# Patient Record
Sex: FEMALE | Race: BLACK OR AFRICAN AMERICAN
[De-identification: names, ages, dates, MRNs, and addresses within clinical notes are randomized per-mention and may not be internally consistent; named-entity substitution may affect disease eponyms.]

---

## 2018-03-15 ENCOUNTER — HOSPITAL ENCOUNTER (OUTPATIENT)
Dept: HOSPITAL 24 - RAD | Age: 53
End: 2018-03-15
Attending: NURSE PRACTITIONER
Payer: MEDICAID

## 2018-03-15 DIAGNOSIS — Z12.31: Primary | ICD-10-CM

## 2018-03-15 PROCEDURE — 77067 SCR MAMMO BI INCL CAD: CPT

## 2018-03-15 NOTE — MG
HISTORY:  SCREENING 



Comparison: Previous mammograms dated 03/13/2017 and 12/03/2015.



FINDINGS: 

Bilateral CC and MLO projections of the right and left breast were obtained.  Scattered fibroglandula
r tissue is seen to be present.  Small intramammary lymph nodes are again seen bilaterally. There is 
no evidence of developing mass, areas architectural distortion, or suspicious microcalcifications in 
either breast..  No skin thickening or nipple retraction is appreciated.   No pathological lymphadeno
[place] can be identified. Benign-appearing calcifications scattered throughout the right and left amara
sts are observed.

 

IMPRESSION:  NO RADIOGRAPHIC EVIDENCE OF MALIGNANCY.  

 

ACR CATEGORY: 2 - benign findings.

 

FOLLOW-UP EXAM 1 YEAR. 



Diagnostic CAD was utilized and reviewed.



* 0 (ZERO) - ASSESSMENT INCOMPLETE; ADDITIONAL IMAGING IS NEEDED. 

* 1/1 (ONE) - NEGATIVE. 

* 2/II (TWO) - BENIGN FINDINGS.

 * 3/III (THREE) - PROBABLY BENIGN FINDING; SHORT INTERVAL FOLLOW-UP

SUGGESTED.

 * 4/IV (FOUR) - SUSPICIOUS ABNORMALITY; BIOPSY SHOULD BE CONSIDERED.

 * 5/V - HIGHLY SUSPICIOUS OF MALIGNANCY; BIOPSY SHOULD BE PERFORMED.

 

A NEGATIVE X-RAY REPORT SHOULD NOT DELAY BIOPSY IF A DOMINANT OR

CLINICALLY SUSPICIOUS MASS IS PRESENT; 4 TO 8 PERCENT OF CANCERS ARE NOT IDENTIFIED BY X-RAY.  A NEGA
TIVE REPORT MAY REINFORCE THE CLINICAL IMPRESSION.  ADENOSIS AND DENSE BREASTS MAY OBSCURE AN UNDERLY
ING NEOPLASM.

New



Reported By:Electronically Signed by WALLY OKEEFE MD at 3/15/2018 1:10:31 PM

## 2018-12-04 ENCOUNTER — HOSPITAL ENCOUNTER (OUTPATIENT)
Dept: HOSPITAL 67 - US | Age: 53
Discharge: HOME | End: 2018-12-04
Attending: NURSE PRACTITIONER
Payer: COMMERCIAL

## 2018-12-04 DIAGNOSIS — R10.13: Primary | ICD-10-CM

## 2020-11-24 ENCOUNTER — HOSPITAL ENCOUNTER (OUTPATIENT)
Dept: HOSPITAL 67 - RAD | Age: 55
Discharge: HOME | End: 2020-11-24
Attending: NURSE PRACTITIONER
Payer: COMMERCIAL

## 2020-11-24 DIAGNOSIS — M06.4: Primary | ICD-10-CM

## 2020-11-24 DIAGNOSIS — M79.7: ICD-10-CM

## 2020-12-04 ENCOUNTER — HOSPITAL ENCOUNTER (EMERGENCY)
Dept: HOSPITAL 67 - ED | Age: 55
Discharge: HOME | End: 2020-12-04
Payer: COMMERCIAL

## 2020-12-04 VITALS — DIASTOLIC BLOOD PRESSURE: 78 MMHG | SYSTOLIC BLOOD PRESSURE: 121 MMHG

## 2020-12-04 VITALS — TEMPERATURE: 98.6 F

## 2020-12-04 VITALS — WEIGHT: 165 LBS | BODY MASS INDEX: 27.49 KG/M2 | HEIGHT: 65 IN
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
## 2020-12-04 DIAGNOSIS — S42.211A: Primary | ICD-10-CM

## 2020-12-04 PROCEDURE — 96372 THER/PROPH/DIAG INJ SC/IM: CPT

## 2020-12-04 PROCEDURE — 99283 EMERGENCY DEPT VISIT LOW MDM: CPT

## 2020-12-10 ENCOUNTER — HOSPITAL ENCOUNTER (OUTPATIENT)
Dept: HOSPITAL 67 - CT | Age: 55
Discharge: HOME | End: 2020-12-10
Attending: PHYSICIAN ASSISTANT
Payer: COMMERCIAL

## 2020-12-10 DIAGNOSIS — S42.221A: Primary | ICD-10-CM

## 2021-01-15 ENCOUNTER — HOSPITAL ENCOUNTER (OUTPATIENT)
Dept: HOSPITAL 67 - RAD | Age: 56
Discharge: HOME | End: 2021-01-15
Attending: PHYSICIAN ASSISTANT
Payer: COMMERCIAL

## 2021-01-15 DIAGNOSIS — M25.511: Primary | ICD-10-CM

## 2021-03-05 ENCOUNTER — HOSPITAL ENCOUNTER (OUTPATIENT)
Dept: HOSPITAL 67 - RAD | Age: 56
Discharge: HOME | End: 2021-03-05
Attending: NURSE PRACTITIONER
Payer: COMMERCIAL

## 2021-03-05 DIAGNOSIS — E55.9: Primary | ICD-10-CM

## 2021-03-05 DIAGNOSIS — M25.511: ICD-10-CM

## 2021-03-05 DIAGNOSIS — M06.4: ICD-10-CM

## 2021-03-05 DIAGNOSIS — M06.09: ICD-10-CM

## 2021-03-05 DIAGNOSIS — M85.89: ICD-10-CM

## 2021-04-26 ENCOUNTER — HOSPITAL ENCOUNTER (OUTPATIENT)
Dept: HOSPITAL 67 - MRI | Age: 56
Discharge: HOME | End: 2021-04-26
Attending: PHYSICIAN ASSISTANT
Payer: COMMERCIAL

## 2021-04-26 DIAGNOSIS — M25.511: Primary | ICD-10-CM

## 2022-11-16 ENCOUNTER — HOSPITAL ENCOUNTER (OUTPATIENT)
Dept: HOSPITAL 67 - LABW | Age: 57
Discharge: HOME | End: 2022-11-16
Attending: PODIATRIST
Payer: COMMERCIAL

## 2022-11-16 DIAGNOSIS — B35.1: Primary | ICD-10-CM

## 2022-11-16 PROCEDURE — 36415 COLL VENOUS BLD VENIPUNCTURE: CPT

## 2022-11-16 PROCEDURE — 84460 ALANINE AMINO (ALT) (SGPT): CPT

## 2022-11-16 PROCEDURE — 84450 TRANSFERASE (AST) (SGOT): CPT

## 2022-12-19 ENCOUNTER — HOSPITAL ENCOUNTER (OUTPATIENT)
Dept: HOSPITAL 67 - LABW | Age: 57
Discharge: HOME | End: 2022-12-19
Attending: PODIATRIST
Payer: COMMERCIAL

## 2022-12-19 DIAGNOSIS — B35.1: Primary | ICD-10-CM

## 2022-12-19 PROCEDURE — 84460 ALANINE AMINO (ALT) (SGPT): CPT

## 2022-12-19 PROCEDURE — 36415 COLL VENOUS BLD VENIPUNCTURE: CPT

## 2022-12-19 PROCEDURE — 84450 TRANSFERASE (AST) (SGOT): CPT

## 2023-04-19 ENCOUNTER — HOSPITAL ENCOUNTER (OUTPATIENT)
Dept: HOSPITAL 67 - OR | Age: 58
Discharge: HOME | End: 2023-04-19
Attending: INTERNAL MEDICINE
Payer: COMMERCIAL

## 2023-04-19 DIAGNOSIS — Z12.11: ICD-10-CM

## 2023-04-19 DIAGNOSIS — K64.8: ICD-10-CM

## 2023-04-19 DIAGNOSIS — D12.3: Primary | ICD-10-CM

## 2023-04-19 PROCEDURE — 0DBL8ZZ EXCISION OF TRANSVERSE COLON, VIA NATURAL OR ARTIFICIAL OPENING ENDOSCOPIC: ICD-10-PCS | Performed by: INTERNAL MEDICINE
